# Patient Record
Sex: FEMALE | Race: WHITE | Employment: OTHER | URBAN - METROPOLITAN AREA
[De-identification: names, ages, dates, MRNs, and addresses within clinical notes are randomized per-mention and may not be internally consistent; named-entity substitution may affect disease eponyms.]

---

## 2019-05-27 ENCOUNTER — APPOINTMENT (OUTPATIENT)
Dept: GENERAL RADIOLOGY | Age: 64
End: 2019-05-27
Attending: EMERGENCY MEDICINE
Payer: COMMERCIAL

## 2019-05-27 ENCOUNTER — APPOINTMENT (OUTPATIENT)
Dept: GENERAL RADIOLOGY | Age: 64
End: 2019-05-27
Payer: COMMERCIAL

## 2019-05-27 ENCOUNTER — HOSPITAL ENCOUNTER (EMERGENCY)
Age: 64
Discharge: ANOTHER HOSPITAL | End: 2019-05-27
Attending: EMERGENCY MEDICINE
Payer: COMMERCIAL

## 2019-05-27 ENCOUNTER — APPOINTMENT (OUTPATIENT)
Dept: CT IMAGING | Age: 64
End: 2019-05-27
Payer: COMMERCIAL

## 2019-05-27 VITALS
BODY MASS INDEX: 26.06 KG/M2 | HEIGHT: 61 IN | HEART RATE: 102 BPM | DIASTOLIC BLOOD PRESSURE: 65 MMHG | RESPIRATION RATE: 20 BRPM | OXYGEN SATURATION: 95 % | WEIGHT: 138 LBS | SYSTOLIC BLOOD PRESSURE: 102 MMHG | TEMPERATURE: 98.4 F

## 2019-05-27 DIAGNOSIS — Z98.2 S/P VP SHUNT: ICD-10-CM

## 2019-05-27 DIAGNOSIS — E86.0 DEHYDRATION: ICD-10-CM

## 2019-05-27 DIAGNOSIS — K86.89 PANCREATIC MASS: ICD-10-CM

## 2019-05-27 DIAGNOSIS — K56.609 SBO (SMALL BOWEL OBSTRUCTION) (HCC): ICD-10-CM

## 2019-05-27 DIAGNOSIS — I95.9 HYPOTENSION, UNSPECIFIED HYPOTENSION TYPE: Primary | ICD-10-CM

## 2019-05-27 LAB
ALBUMIN SERPL-MCNC: 2.9 G/DL (ref 3.5–5)
ALBUMIN/GLOB SERPL: 0.7 {RATIO} (ref 1.1–2.2)
ALP SERPL-CCNC: 109 U/L (ref 45–117)
ALT SERPL-CCNC: 19 U/L (ref 12–78)
ANION GAP SERPL CALC-SCNC: 18 MMOL/L (ref 5–15)
AST SERPL-CCNC: 7 U/L (ref 15–37)
BASOPHILS # BLD: 0 K/UL (ref 0–0.1)
BASOPHILS NFR BLD: 0 % (ref 0–1)
BILIRUB SERPL-MCNC: 0.5 MG/DL (ref 0.2–1)
BUN SERPL-MCNC: 69 MG/DL (ref 6–20)
BUN/CREAT SERPL: 14 (ref 12–20)
CALCIUM SERPL-MCNC: 7.2 MG/DL (ref 8.5–10.1)
CHLORIDE SERPL-SCNC: 95 MMOL/L (ref 97–108)
CK SERPL-CCNC: 53 U/L (ref 26–192)
CO2 SERPL-SCNC: 17 MMOL/L (ref 21–32)
CREAT SERPL-MCNC: 4.83 MG/DL (ref 0.55–1.02)
DIFFERENTIAL METHOD BLD: ABNORMAL
EOSINOPHIL # BLD: 0 K/UL (ref 0–0.4)
EOSINOPHIL NFR BLD: 0 % (ref 0–7)
ERYTHROCYTE [DISTWIDTH] IN BLOOD BY AUTOMATED COUNT: 17.6 % (ref 11.5–14.5)
GLOBULIN SER CALC-MCNC: 4 G/DL (ref 2–4)
GLUCOSE SERPL-MCNC: 98 MG/DL (ref 65–100)
HCT VFR BLD AUTO: 44.6 % (ref 35–47)
HGB BLD-MCNC: 15 G/DL (ref 11.5–16)
IMM GRANULOCYTES # BLD AUTO: 0.1 K/UL (ref 0–0.04)
IMM GRANULOCYTES NFR BLD AUTO: 1 % (ref 0–0.5)
LACTATE SERPL-SCNC: 2 MMOL/L (ref 0.4–2)
LYMPHOCYTES # BLD: 1.1 K/UL (ref 0.8–3.5)
LYMPHOCYTES NFR BLD: 8 % (ref 12–49)
MAGNESIUM SERPL-MCNC: 2.4 MG/DL (ref 1.6–2.4)
MCH RBC QN AUTO: 29.1 PG (ref 26–34)
MCHC RBC AUTO-ENTMCNC: 33.6 G/DL (ref 30–36.5)
MCV RBC AUTO: 86.6 FL (ref 80–99)
MONOCYTES # BLD: 2.1 K/UL (ref 0–1)
MONOCYTES NFR BLD: 15 % (ref 5–13)
NEUTS SEG # BLD: 10.4 K/UL (ref 1.8–8)
NEUTS SEG NFR BLD: 76 % (ref 32–75)
NRBC # BLD: 0 K/UL (ref 0–0.01)
NRBC BLD-RTO: 0 PER 100 WBC
PLATELET # BLD AUTO: 277 K/UL (ref 150–400)
PMV BLD AUTO: 11.1 FL (ref 8.9–12.9)
POTASSIUM SERPL-SCNC: 4.2 MMOL/L (ref 3.5–5.1)
PROCALCITONIN SERPL-MCNC: 1.1 NG/ML
PROT SERPL-MCNC: 6.9 G/DL (ref 6.4–8.2)
RBC # BLD AUTO: 5.15 M/UL (ref 3.8–5.2)
RBC MORPH BLD: ABNORMAL
RBC MORPH BLD: ABNORMAL
SODIUM SERPL-SCNC: 130 MMOL/L (ref 136–145)
TROPONIN I SERPL-MCNC: <0.05 NG/ML
WBC # BLD AUTO: 13.7 K/UL (ref 3.6–11)

## 2019-05-27 PROCEDURE — 71045 X-RAY EXAM CHEST 1 VIEW: CPT

## 2019-05-27 PROCEDURE — 82550 ASSAY OF CK (CPK): CPT

## 2019-05-27 PROCEDURE — 99285 EMERGENCY DEPT VISIT HI MDM: CPT

## 2019-05-27 PROCEDURE — 74011636320 HC RX REV CODE- 636/320: Performed by: EMERGENCY MEDICINE

## 2019-05-27 PROCEDURE — 74011000250 HC RX REV CODE- 250: Performed by: PHYSICIAN ASSISTANT

## 2019-05-27 PROCEDURE — 74018 RADEX ABDOMEN 1 VIEW: CPT

## 2019-05-27 PROCEDURE — 87040 BLOOD CULTURE FOR BACTERIA: CPT

## 2019-05-27 PROCEDURE — 93005 ELECTROCARDIOGRAM TRACING: CPT

## 2019-05-27 PROCEDURE — 74177 CT ABD & PELVIS W/CONTRAST: CPT

## 2019-05-27 PROCEDURE — 74011250636 HC RX REV CODE- 250/636: Performed by: PHYSICIAN ASSISTANT

## 2019-05-27 PROCEDURE — 84145 PROCALCITONIN (PCT): CPT

## 2019-05-27 PROCEDURE — 84484 ASSAY OF TROPONIN QUANT: CPT

## 2019-05-27 PROCEDURE — 85025 COMPLETE CBC W/AUTO DIFF WBC: CPT

## 2019-05-27 PROCEDURE — 83735 ASSAY OF MAGNESIUM: CPT

## 2019-05-27 PROCEDURE — 83605 ASSAY OF LACTIC ACID: CPT

## 2019-05-27 PROCEDURE — 74011250636 HC RX REV CODE- 250/636: Performed by: EMERGENCY MEDICINE

## 2019-05-27 PROCEDURE — 80053 COMPREHEN METABOLIC PANEL: CPT

## 2019-05-27 PROCEDURE — 96374 THER/PROPH/DIAG INJ IV PUSH: CPT

## 2019-05-27 PROCEDURE — 70450 CT HEAD/BRAIN W/O DYE: CPT

## 2019-05-27 PROCEDURE — 96361 HYDRATE IV INFUSION ADD-ON: CPT

## 2019-05-27 PROCEDURE — 36415 COLL VENOUS BLD VENIPUNCTURE: CPT

## 2019-05-27 RX ORDER — SODIUM CHLORIDE 0.9 % (FLUSH) 0.9 %
5-40 SYRINGE (ML) INJECTION AS NEEDED
Status: DISCONTINUED | OUTPATIENT
Start: 2019-05-27 | End: 2019-05-27 | Stop reason: HOSPADM

## 2019-05-27 RX ORDER — SODIUM CHLORIDE 9 MG/ML
100 INJECTION, SOLUTION INTRAVENOUS CONTINUOUS
Status: DISCONTINUED | OUTPATIENT
Start: 2019-05-27 | End: 2019-05-27 | Stop reason: HOSPADM

## 2019-05-27 RX ORDER — ONDANSETRON 2 MG/ML
8 INJECTION INTRAMUSCULAR; INTRAVENOUS
Status: COMPLETED | OUTPATIENT
Start: 2019-05-27 | End: 2019-05-27

## 2019-05-27 RX ORDER — LIDOCAINE HYDROCHLORIDE 20 MG/ML
10 SOLUTION OROPHARYNGEAL
Status: COMPLETED | OUTPATIENT
Start: 2019-05-27 | End: 2019-05-27

## 2019-05-27 RX ORDER — SODIUM CHLORIDE 0.9 % (FLUSH) 0.9 %
5-40 SYRINGE (ML) INJECTION EVERY 8 HOURS
Status: DISCONTINUED | OUTPATIENT
Start: 2019-05-27 | End: 2019-05-27 | Stop reason: HOSPADM

## 2019-05-27 RX ORDER — ONDANSETRON 2 MG/ML
4 INJECTION INTRAMUSCULAR; INTRAVENOUS
Status: COMPLETED | OUTPATIENT
Start: 2019-05-27 | End: 2019-05-27

## 2019-05-27 RX ORDER — FENTANYL CITRATE 50 UG/ML
25 INJECTION, SOLUTION INTRAMUSCULAR; INTRAVENOUS
Status: DISCONTINUED | OUTPATIENT
Start: 2019-05-27 | End: 2019-05-27 | Stop reason: HOSPADM

## 2019-05-27 RX ORDER — SODIUM CHLORIDE 0.9 % (FLUSH) 0.9 %
10 SYRINGE (ML) INJECTION
Status: COMPLETED | OUTPATIENT
Start: 2019-05-27 | End: 2019-05-27

## 2019-05-27 RX ADMIN — ONDANSETRON 8 MG: 2 INJECTION INTRAMUSCULAR; INTRAVENOUS at 12:08

## 2019-05-27 RX ADMIN — Medication 10 ML: at 12:37

## 2019-05-27 RX ADMIN — BENZOCAINE 1 SPRAY: 200 SPRAY DENTAL; ORAL; PERIODONTAL at 14:19

## 2019-05-27 RX ADMIN — LIDOCAINE HYDROCHLORIDE 10 ML: 20 SOLUTION ORAL; TOPICAL at 14:19

## 2019-05-27 RX ADMIN — ONDANSETRON 4 MG: 2 INJECTION INTRAMUSCULAR; INTRAVENOUS at 11:40

## 2019-05-27 RX ADMIN — IOPAMIDOL 100 ML: 755 INJECTION, SOLUTION INTRAVENOUS at 12:37

## 2019-05-27 RX ADMIN — SODIUM CHLORIDE 1000 ML: 900 INJECTION, SOLUTION INTRAVENOUS at 11:40

## 2019-05-27 NOTE — ED PROVIDER NOTES
EMERGENCY DEPARTMENT HISTORY AND PHYSICAL EXAM      Date: 5/27/2019  Patient Name: Hussain Elena    History of Presenting Illness     Chief Complaint   Patient presents with    Hypotension     [facility] to [facility] and dizziness       History Provided By: Patient and Patient's Sister    HPI: Hussain Elena, 58 y.o. female presents via EMS from [facility] with c/o hypotension, dizziness, and abdominal pain. Pt's sister states the patient began with vomiting on Friday, 5/24/19. Her vomiting spontaneously stopped by Saturday, but patient noted LLQ abdominal pain over 5/25/19 and 5/26/19. She reports the pain felt as if it was improving, thus did not follow up for evaluation until this am when she awoke feeling fatigued. She denied chest pain or shortness of breath. Lightheadedness worsened with movement/position changes. She was evaluated at [facility] and referred to the nearest ED as patient's blood pressure was recorded at 60 systolic. Pt denied use of diuretics but does take blood pressure medications. Her sister advises she has a h/o diverticulosis. She reports the patient was seen for colonoscopy in 12/18 (pt sees Dr. Matthew Hernandez) and required emergency splenectomy in 1/19. She also reports h/o hernia repair. She denied dysuria/hematuria. She denied fever/chills. Has had decreased oral intake since her symptoms began 5/24/19. No diarrhea, constipation or straining. She denied BRBPR/melena. Pt is s/p  shunt. She denied headache, confusion, weakness or problems with speech. Pt received 1000cc NS via EMS en route.            Chief Complaint: hypotension, dizziness  Duration: 1 Weeks  Timing:  Acute  Location: abdomen  Quality: Aching, Dull and Tightness  Severity: Severe  Modifying Factors: pt began with abd pain, vomiting 5/24, s/p  shunt  Associated Symptoms: dizziness, fatigue        There are no other complaints, changes, or physical findings at this time.    PCP: Alyson Marquez MD    Current Facility-Administered Medications   Medication Dose Route Frequency Provider Last Rate Last Dose    sodium chloride (NS) flush 5-40 mL  5-40 mL IntraVENous Q8H Aide Molina [place]        sodium chloride (NS) flush 5-40 mL  5-40 mL IntraVENous COLLEENN Heidi Canas        fentaNYL citrate (PF) injection 25 mcg  25 mcg IntraVENous NOW Nevin Saenz MD           Past History     Past Medical History:  History reviewed. No pertinent past medical history. Past Surgical History:  History reviewed. No pertinent surgical history. Family History:  History reviewed. No pertinent family history. Social History:  Social History     Tobacco Use    Smoking status: Not on file   Substance Use Topics    Alcohol use: Not on file    Drug use: Not on file       Allergies:  No Known Allergies      Review of Systems   Review of Systems   Constitutional: Positive for activity change, appetite change and fatigue. Negative for chills and fever. HENT: Negative for congestion, rhinorrhea and sore throat. Eyes: Negative for photophobia and visual disturbance. Respiratory: Negative for cough and shortness of breath. Cardiovascular: Negative for chest pain and palpitations. Gastrointestinal: Positive for abdominal pain, nausea and vomiting. Negative for diarrhea. Endocrine: Negative for polydipsia, polyphagia and polyuria. Genitourinary: Negative for dysuria and hematuria. Musculoskeletal: Negative for neck pain and neck stiffness. Skin: Positive for pallor. Negative for rash and wound. Allergic/Immunologic: Negative for environmental allergies and food allergies. Neurological: Positive for dizziness, weakness and light-headedness. Negative for headaches. Hematological: Negative for adenopathy. Does not bruise/bleed easily. Psychiatric/Behavioral: Negative for agitation and confusion. The patient is nervous/anxious.         Physical Exam   Physical Exam   Constitutional: She is oriented to person, place, and time. She appears well-developed and well-nourished. WDWN elderly female, alert, anxious   HENT:   Head: Normocephalic and atraumatic. Nose: Nose normal.   Mouth/Throat: No oropharyngeal exudate. Dry oral mucosa   Eyes: Pupils are equal, round, and reactive to light. Conjunctivae and EOM are normal. Right eye exhibits no discharge. Left eye exhibits no discharge. No scleral icterus. Neck: Normal range of motion. Neck supple. No JVD present. No tracheal deviation present. No thyromegaly present. No nuchal rigidity   Cardiovascular: Normal rate, regular rhythm and normal heart sounds. Pulmonary/Chest: Effort normal and breath sounds normal. No respiratory distress. She has no wheezes. She exhibits no tenderness. Abdominal: Soft. She exhibits no distension. There is tenderness. There is no rebound and no guarding. abd soft, tender to LLQ, decreased BS, no CVAT   Musculoskeletal: Normal range of motion. She exhibits no edema. Lymphadenopathy:     She has no cervical adenopathy. Neurological: She is alert and oriented to person, place, and time. No cranial nerve deficit. She exhibits normal muscle tone. Coordination normal.   FNF intact, no pronator drift   Skin: Skin is warm and dry. She is not diaphoretic. There is pallor. Psychiatric: She has a normal mood and affect. Her behavior is normal. Judgment normal.   Nursing note and vitals reviewed.       Diagnostic Study Results     Labs -     Recent Results (from the past 12 hour(s))   EKG, 12 LEAD, INITIAL    Collection Time: 05/27/19 11:17 AM   Result Value Ref Range    Ventricular Rate 98 BPM    Atrial Rate 98 BPM    P-R Interval 130 ms    QRS Duration 108 ms    Q-T Interval 386 ms    QTC Calculation (Bezet) 492 ms    Calculated P Axis 55 degrees    Calculated R Axis 46 degrees    Calculated T Axis 50 degrees    Diagnosis       Sinus rhythm with premature atrial complexes  Incomplete right bundle branch block  Prolonged QT  No previous ECGs available     CBC WITH AUTOMATED DIFF    Collection Time: 05/27/19 11:29 AM   Result Value Ref Range    WBC 13.7 (H) 3.6 - 11.0 K/uL    RBC 5.15 3.80 - 5.20 M/uL    HGB 15.0 11.5 - 16.0 g/dL    HCT 44.6 35.0 - 47.0 %    MCV 86.6 80.0 - 99.0 FL    MCH 29.1 26.0 - 34.0 PG    MCHC 33.6 30.0 - 36.5 g/dL    RDW 17.6 (H) 11.5 - 14.5 %    PLATELET 172 049 - 405 K/uL    MPV 11.1 8.9 - 12.9 FL    NRBC 0.0 0  WBC    ABSOLUTE NRBC 0.00 0.00 - 0.01 K/uL    NEUTROPHILS 76 (H) 32 - 75 %    LYMPHOCYTES 8 (L) 12 - 49 %    MONOCYTES 15 (H) 5 - 13 %    EOSINOPHILS 0 0 - 7 %    BASOPHILS 0 0 - 1 %    IMMATURE GRANULOCYTES 1 (H) 0.0 - 0.5 %    ABS. NEUTROPHILS 10.4 (H) 1.8 - 8.0 K/UL    ABS. LYMPHOCYTES 1.1 0.8 - 3.5 K/UL    ABS. MONOCYTES 2.1 (H) 0.0 - 1.0 K/UL    ABS. EOSINOPHILS 0.0 0.0 - 0.4 K/UL    ABS. BASOPHILS 0.0 0.0 - 0.1 K/UL    ABS. IMM.  GRANS. 0.1 (H) 0.00 - 0.04 K/UL    DF AUTOMATED      RBC COMMENTS ANISOCYTOSIS  1+        RBC COMMENTS JON CELLS  PRESENT       LACTIC ACID    Collection Time: 05/27/19 12:12 PM   Result Value Ref Range    Lactic acid 2.0 0.4 - 2.0 MMOL/L   TROPONIN I    Collection Time: 05/27/19 12:57 PM   Result Value Ref Range    Troponin-I, Qt. <0.05 <0.05 ng/mL   CK W/ REFLX CKMB    Collection Time: 05/27/19 12:57 PM   Result Value Ref Range    CK 53 26 - 192 U/L   MAGNESIUM    Collection Time: 05/27/19 12:57 PM   Result Value Ref Range    Magnesium 2.4 1.6 - 2.4 mg/dL   METABOLIC PANEL, COMPREHENSIVE    Collection Time: 05/27/19 12:57 PM   Result Value Ref Range    Sodium 130 (L) 136 - 145 mmol/L    Potassium 4.2 3.5 - 5.1 mmol/L    Chloride 95 (L) 97 - 108 mmol/L    CO2 17 (L) 21 - 32 mmol/L    Anion gap 18 (H) 5 - 15 mmol/L    Glucose 98 65 - 100 mg/dL    BUN 69 (H) 6 - 20 MG/DL    Creatinine 4.83 (H) 0.55 - 1.02 MG/DL    BUN/Creatinine ratio 14 12 - 20      GFR est AA 11 (L) >60 ml/min/1.73m2    GFR est non-AA 9 (L) >60 ml/min/1.73m2 Calcium 7.2 (L) 8.5 - 10.1 MG/DL    Bilirubin, total 0.5 0.2 - 1.0 MG/DL    ALT (SGPT) 19 12 - 78 U/L    AST (SGOT) 7 (L) 15 - 37 U/L    Alk. phosphatase 109 45 - 117 U/L    Protein, total 6.9 6.4 - 8.2 g/dL    Albumin 2.9 (L) 3.5 - 5.0 g/dL    Globulin 4.0 2.0 - 4.0 g/dL    A-G Ratio 0.7 (L) 1.1 - 2.2     PROCALCITONIN    Collection Time: 05/27/19  1:45 PM   Result Value Ref Range    Procalcitonin 1.1 ng/mL       Radiologic Studies -   XR CHEST PORT   Final Result      XR ABD (KUB)   Final Result   1. Nasogastric tube is coiled overlying the patient's hiatal hernia         CT ABD PELV W CONT   Final Result      1. Small bowel obstruction due to small bowel herniated into a left inguinal   hernia   2. Complex cystic mass within the tail of pancreas measuring 7.9 cm. Could   represent cystic pancreatic neoplasm versus sequela of previous pancreatitis   3. Bilateral indeterminate adrenal nodules. Recommend correlation with any   previous imaging      CT HEAD WO CONT   Final Result   1. Bilateral ventricular shunts. Ventricular system is decompressed without   hydrocephalus            XR CHEST PORT    (Results Pending)     CT Results  (Last 48 hours)               05/27/19 1232  CT ABD PELV W CONT Final result    Impression:      1. Small bowel obstruction due to small bowel herniated into a left inguinal   hernia   2. Complex cystic mass within the tail of pancreas measuring 7.9 cm. Could   represent cystic pancreatic neoplasm versus sequela of previous pancreatitis   3. Bilateral indeterminate adrenal nodules. Recommend correlation with any   previous imaging       Narrative:  EXAM: CT ABD PELV W CONT       INDICATION: Abdominal pain; LLQ abd pain, vomiting, hypotension       COMPARISON: None        CONTRAST: 100 mL of Isovue-370. TECHNIQUE:    Following the uneventful intravenous administration of contrast, thin axial   images were obtained through the abdomen and pelvis.  Coronal and sagittal reconstructions were generated. Oral contrast was not administered. CT dose   reduction was achieved through use of a standardized protocol tailored for this   examination and automatic exposure control for dose modulation. FINDINGS:    LUNG BASES: Clear. INCIDENTALLY IMAGED HEART AND MEDIASTINUM: Moderate size hiatal hernia   LIVER: Cyst within the central liver   GALLBLADDER: Surgically absent   SPLEEN: No mass. PANCREAS: 7.9 x 7.4 cm complex cystic mass within the tail of the pancreas. There are surgical clips at its anterior margin. ADRENALS: Bilateral adrenal nodules. 2.4; right adrenal nodule. 2.7 Cm left   adrenal nodule. These are indeterminate   KIDNEYS: Small round hypodensities within both kidneys. 2. Small to characterize. No hydronephrosis   STOMACH: Unremarkable. SMALL BOWEL: Small bowel is dilated due to small bowel extending into a left   inguinal hernia. There is fluid within the hernia sac   COLON: No dilatation or wall thickening. APPENDIX: Unremarkable. PERITONEUM:  shunt tubing is demonstrated within the abdomen. No free air. RETROPERITONEUM: No lymphadenopathy or aortic aneurysm. Mild vascular   calcifications   REPRODUCTIVE ORGANS: Heterogeneously enlarged   URINARY BLADDER: Decompressed   BONES: No destructive bone lesion. ADDITIONAL COMMENTS: N/A           05/27/19 1232  CT HEAD WO CONT Final result    Impression:  1. Bilateral ventricular shunts. Ventricular system is decompressed without   hydrocephalus               Narrative:  EXAM: CT HEAD WO CONT       INDICATION: hx of  shunt with vomiting       COMPARISON: None. CONTRAST: None. TECHNIQUE: Unenhanced CT of the head was performed using 5 mm images. Brain and   bone windows were generated. CT dose reduction was achieved through use of a   standardized protocol tailored for this examination and automatic exposure   control for dose modulation.          FINDINGS:   There is a left frontal and right parietal shunt. Right frontal shunt terminates   in the anterior right frontal lobe. The ventricles are decompressed. There is no   significant white matter disease. There is no intracranial hemorrhage,   extra-axial collection, mass, mass effect or midline shift. The basilar   cisterns are open. Incidental leonela-cisterna magna No acute infarct is   identified. The bone windows demonstrate no abnormalities. The visualized   portions of the paranasal sinuses and mastoid air cells are clear. Medical Decision Making   I am the first provider for this patient. I reviewed the vital signs, available nursing notes, past medical history, past surgical history, family history and social history. Vital Signs-Reviewed the patient's vital signs. Patient Vitals for the past 12 hrs:   Temp Pulse Resp BP SpO2   05/27/19 1222  98 14  96 %   05/27/19 1215  98 21  95 %   05/27/19 1211  (!) 101 10  98 %   05/27/19 1202  99 16 101/65 96 %   05/27/19 1200  96 20  95 %   05/27/19 1137  (!) 102 28 (!) 84/63 97 %   05/27/19 1135 98.4 °F (36.9 °C) (!) 107 20 (!) 84/63 98 %   05/27/19 1134  100 18 (!) 88/70 96 %   05/27/19 1132  98 13 (!) 88/70 97 %   05/27/19 1132  (!) 101 28 102/68 96 %   05/27/19 1130  100 20 102/68 98 %   05/27/19 1130  (!) 101 24 94/63 96 %   05/27/19 1126     98 %   05/27/19 1118 98.2 °F (36.8 °C) 100 24 102/69 98 %         Records Reviewed: Nursing Notes, Old Medical Records, Ambulance Run Sheet, Previous Radiology Studies and Previous Laboratory Studies    Provider Notes (Medical Decision Making):   Sepsis, dehydration, electrolyte dysfunction, UTI, arrhythmia, ACS    ED Course:   Initial assessment performed. The patients presenting problems have been discussed, and they are in agreement with the care plan formulated and outlined with them. I have encouraged them to ask questions as they arise throughout their visit. Case d/w Dr. Mc Cranston.   She will evaluate at bedside. Pt is a [facility] patient, thus will require transfer to [facility] facility for admission once stabilized. [facility] made aware of need to be transferred to a [facility] facility; however, they felt she was unstable to go anywhere except the closest ED. CT scans reviewed with Dr. Kt Apple. NG tube placement to be performed with Hurricaine spray and visc [place]. Good return noted from NG tube. Will obtain KUB to verify placement. Pt tolerated well. Reviewed results with patient and advised transfer call had been placed. Good understanding noted. Transfer call placed to [facility]. Case discussed. Dr. Elijah Steel MD will accept the ED to ED transfer patient. CT contacted with all images burned on CD for transfer. CRITICAL CARE NOTE :  IMPENDING DETERIORATION -Airway, Respiratory, Cardiovascular, CNS, Metabolic, Renal and Hepatic    ASSOCIATED RISK FACTORS - Hypotension, Shock, Dysrhythmia, Metabolic changes, Vascular Compromise and CNS Decompensation    MANAGEMENT- Bedside Assessment    INTERPRETATION -  Blood Pressure, ECG, and lab work, CT scans    INTERVENTIONS - hemodynamic mngmt and Metobolic interventions    CASE REVIEW - [facility] accepting physician: Elijah Steel MD    TREATMENT RESPONSE -Stable    PERFORMED BY - Self and Physician      NOTES   :  I have spent 60 minutes of critical care time involved in lab review, consultations with specialist, family decision- making, bedside attention and documentation. During this entire length of time I was immediately available to the patient . [place], [place]      PLAN:  1. Transfer to [facility]    Diagnosis     Clinical Impression:   1. Hypotension, unspecified hypotension type    2. SBO (small bowel obstruction) ([facility] [address]    3. Pancreatic mass    4. Dehydration    5.  S/P  shunt

## 2019-05-28 LAB
ATRIAL RATE: 98 BPM
CALCULATED P AXIS, ECG09: 55 DEGREES
CALCULATED R AXIS, ECG10: 46 DEGREES
CALCULATED T AXIS, ECG11: 50 DEGREES
DIAGNOSIS, 93000: NORMAL
P-R INTERVAL, ECG05: 130 MS
Q-T INTERVAL, ECG07: 386 MS
QRS DURATION, ECG06: 108 MS
QTC CALCULATION (BEZET), ECG08: 492 MS
VENTRICULAR RATE, ECG03: 98 BPM

## 2019-06-02 LAB
BACTERIA SPEC CULT: NORMAL
BACTERIA SPEC CULT: NORMAL
SERVICE CMNT-IMP: NORMAL
SERVICE CMNT-IMP: NORMAL